# Patient Record
Sex: FEMALE | Race: WHITE
[De-identification: names, ages, dates, MRNs, and addresses within clinical notes are randomized per-mention and may not be internally consistent; named-entity substitution may affect disease eponyms.]

---

## 2019-07-24 ENCOUNTER — HOSPITAL ENCOUNTER (OUTPATIENT)
Dept: HOSPITAL 74 - JER | Age: 84
Setting detail: OBSERVATION
LOS: 1 days | Discharge: HOME | End: 2019-07-25
Attending: INTERNAL MEDICINE | Admitting: INTERNAL MEDICINE
Payer: COMMERCIAL

## 2019-07-24 VITALS — BODY MASS INDEX: 25 KG/M2

## 2019-07-24 DIAGNOSIS — H40.9: ICD-10-CM

## 2019-07-24 DIAGNOSIS — I10: ICD-10-CM

## 2019-07-24 DIAGNOSIS — R55: Primary | ICD-10-CM

## 2019-07-24 DIAGNOSIS — F43.9: ICD-10-CM

## 2019-07-24 DIAGNOSIS — E03.9: ICD-10-CM

## 2019-07-24 DIAGNOSIS — R73.9: ICD-10-CM

## 2019-07-24 LAB
ALBUMIN SERPL-MCNC: 3.5 G/DL (ref 3.4–5)
ALP SERPL-CCNC: 96 U/L (ref 45–117)
ALT SERPL-CCNC: 25 U/L (ref 13–61)
ANION GAP SERPL CALC-SCNC: 6 MMOL/L (ref 8–16)
APPEARANCE UR: CLEAR
AST SERPL-CCNC: 18 U/L (ref 15–37)
BASOPHILS # BLD: 0.5 % (ref 0–2)
BILIRUB SERPL-MCNC: 0.3 MG/DL (ref 0.2–1)
BILIRUB UR STRIP.AUTO-MCNC: NEGATIVE MG/DL
BUN SERPL-MCNC: 18.6 MG/DL (ref 7–18)
CALCIUM SERPL-MCNC: 8.6 MG/DL (ref 8.5–10.1)
CHLORIDE SERPL-SCNC: 106 MMOL/L (ref 98–107)
CO2 SERPL-SCNC: 26 MMOL/L (ref 21–32)
COLOR UR: YELLOW
CREAT SERPL-MCNC: 1 MG/DL (ref 0.55–1.3)
DEPRECATED RDW RBC AUTO: 13.9 % (ref 11.6–15.6)
EOSINOPHIL # BLD: 0.9 % (ref 0–4.5)
GLUCOSE SERPL-MCNC: 162 MG/DL (ref 74–106)
HCT VFR BLD CALC: 35.1 % (ref 32.4–45.2)
HGB BLD-MCNC: 12.3 GM/DL (ref 10.7–15.3)
INR BLD: 1.05 (ref 0.83–1.09)
KETONES UR QL STRIP: NEGATIVE
LEUKOCYTE ESTERASE UR QL STRIP.AUTO: NEGATIVE
LYMPHOCYTES # BLD: 14.7 % (ref 8–40)
MCH RBC QN AUTO: 32 PG (ref 25.7–33.7)
MCHC RBC AUTO-ENTMCNC: 34.9 G/DL (ref 32–36)
MCV RBC: 91.6 FL (ref 80–96)
MONOCYTES # BLD AUTO: 7.8 % (ref 3.8–10.2)
NEUTROPHILS # BLD: 76.1 % (ref 42.8–82.8)
NITRITE UR QL STRIP: NEGATIVE
PH UR: 6.5 [PH] (ref 5–8)
PLATELET # BLD AUTO: 213 K/MM3 (ref 134–434)
PMV BLD: 8 FL (ref 7.5–11.1)
POTASSIUM SERPLBLD-SCNC: 4 MMOL/L (ref 3.5–5.1)
PROT SERPL-MCNC: 6.6 G/DL (ref 6.4–8.2)
PROT UR QL STRIP: (no result)
PROT UR QL STRIP: NEGATIVE
PT PNL PPP: 12.4 SEC (ref 9.7–13)
RBC # BLD AUTO: 3.83 M/MM3 (ref 3.6–5.2)
SODIUM SERPL-SCNC: 138 MMOL/L (ref 136–145)
SP GR UR: 1.02 (ref 1.01–1.03)
UROBILINOGEN UR STRIP-MCNC: 1 MG/DL (ref 0.2–1)
WBC # BLD AUTO: 7.8 K/MM3 (ref 4–10)

## 2019-07-24 PROCEDURE — G0378 HOSPITAL OBSERVATION PER HR: HCPCS

## 2019-07-24 NOTE — PDOC
Rapid Medical Evaluation


Medical Evaluation: 





07/24/19 21:14


This patient had a brief in-person evaluation in triage





CC:fever, headache and sorethroat since Monday


     [place]





PE: NAD


     unlabored breathing


     heart s1s2





orders: antipyretic, rapid strep





This patient will proceed to the ED for further evaluation


07/24/19 21:15








**Discharge Disposition





- Diagnosis


 Fever








- Referrals





- Patient Instructions





- Post Discharge Activity

## 2019-07-24 NOTE — PDOC
Documentation entered by Karolyn Marquez SCRIBE, acting as scribe for Annabelle Bangura DO.








Annabelle Bangura DO:  This documentation has been prepared by the Alma morocho Adrianna, SCRIBE, under my direction and personally reviewed by me in its 

entirety.  I confirm that the documentation accurately reflects all work, 

treatment, procedures, and medical decision making performed by me.  





History of Present Illness





- General


Chief Complaint: Syncope/Near Syncope


Stated Complaint: SYNCOPE


Time Seen by Provider: 07/24/19 22:22





- History of Present Illness


Initial Comments: 


The patient is an 84 year old female, with a significant PMH of HTN, hypothyroid

, and glaucoma, who presents to the ED [facility] for evaluation of syncope. Patient 

notes she was at the diner, when she suddenly felt warm, tired, and strange. 

Patients son notes she became lethargic, began to slump over, and became 

unresponsive. He notes this episode lasted for 3-4 minutes, followed by patient 

returning to baseline. He denies any head contusion, shaking, or incontinence 

during the episode. She denies any lightheadedness, dizziness, chest pain, SOB, 

palpitations, headache, blurry vision, changes in vision, dysuria, nausea, vomit

, diarrhea prior to the episode, or while in the ED. 





Allergies: Brimonidine, hydrochlorothiazide, epinephrine, lisinopril 


Surgical History: Right breast mass removal, appendectomy 


Social History: Denies EtOH, tobacco, or illicit drug use


PCP: NOS








Past History





- Past Medical History


Allergies/Adverse Reactions: 


 Allergies











Allergy/AdvReac Type Severity Reaction Status Date / Time


 


brimonidine [From Alphagan P] Allergy   Verified 07/24/19 23:12


 


hydrochlorothiazide Allergy   Verified 07/24/19 23:12


 


epinephrine AdvReac   Verified 07/24/19 23:12


 


lisinopril AdvReac  Cough Verified 07/24/19 23:12











Home Medications: 


Ambulatory Orders





Latanoprost 0.005% Eye Drops [Xalatan 0.005% Eye Drops -] 1 drop HS 07/24/19 


Levothyroxine [Synthroid -] 50 mcg PO AM 07/24/19 


Olmesartan Medoxomil [Benicar (Nf)] 20 mg PO AM 07/24/19 


Timolol 0.5% [Timoptic 0.5%] 1 drop OD AM 07/24/19 











- Suicide/Smoking/Psychosocial Hx


Smoking History: Never smoked


Have you smoked in the past 12 months: No


Information on smoking cessation initiated: No


Hx Alcohol Use: No


Drug/Substance Use Hx: No





**Review of Systems





- Review of Systems


Comments:: 


GENERAL/CONSTITUTIONAL: +S/p syncope. +Warm prior to episode. +Lethargic prior 

to episode. No fever or chills. No weakness.


HEAD, EYES, EARS, NOSE AND THROAT: No change in vision. No ear pain or 

discharge. No sore throat.


GASTROINTESTINAL: No nausea, vomiting, diarrhea or constipation.


GENITOURINARY: No dysuria, frequency, or change in urination.


CARDIOVASCULAR: No chest pain or shortness of breath.


RESPIRATORY: No cough, wheezing, or hemoptysis.


MUSCULOSKELETAL: No joint or muscle swelling or pain. No neck or back pain.


SKIN: No rash


NEUROLOGIC: No headache, vertigo, loss of consciousness, or change in strength/

sensation.


ENDOCRINE: No increased thirst. No abnormal weight change.


HEMATOLOGIC/LYMPHATIC: No anemia, easy bleeding, or history of blood clots.


ALLERGIC/IMMUNOLOGIC: No hives or skin allergy.








*Physical Exam





- Vital Signs


 Last Vital Signs











Temp Pulse Resp BP Pulse Ox


 


 98.1 F   78   20   170/81   98 


 


 07/24/19 21:25  07/24/19 22:35  07/24/19 22:35  07/24/19 22:35  07/24/19 22:35














- Physical Exam


Comments: 


Constitutional: Awake, alert, oriented.  No acute distress.


Head:  Normocephalic.  Atraumatic


Eyes:  PERRL. EOMI.  Conjunctivae are not pale.


ENT:  Mucous membranes are moist and intact. Posterior pharynx without exudates 

or erythema. Uvula midline.


Neck:  Supple.  Full ROM. No lymphadenopathy.


Cardiovascular:  Regular rate.  Regular rhythm. S1, S2 regular.  Distal pulses 

are 2+ and symmetric.  


Pulmonary/Chest:  No evidence of respiratory distress.  Clear to auscultation 

bilaterally  No wheezing, rales or rhonchi.


Abdominal:  Soft and nondistended.  There is no tenderness.  No rebound, 

guarding or rigidity.  No organomegaly. No palpable masses. Good bowel sounds.


Back:  No CVA tenderness.


Musculoskeletal:  No edema.  No cyanosis.  No clubbing.  Full range of motion 

in all extremities.  No calf tenderness. Radial/pedal pulses are intact and 2+ 

bilaterally


Skin:  Skin is warm and dry.  No petechiae.  No purpura.  


Neurological:  Alert and oriented to person, place, and time.  Cranial nerves II

-XII are grossly intact. Normal speech. Strength is grossly symmetric. No 

sensory deficits.


Psychiatric:  Good eye contact.  Normal interaction, affect and behavior.





**Heart Score/ECG Review





- ECG Intrepretation


Comment:: 





07/24/19 22:52


sinus at 69 with 1st degree av block, nl axis, nl interval, no acute st/t wave 

findings





ED Treatment Course





- LABORATORY


CBC & Chemistry Diagram: 


 07/24/19 22:50





 07/24/19 22:50





- ADDITIONAL ORDERS


Additional order review: 


 Laboratory  Results











  07/24/19 07/24/19 07/24/19





  23:35 22:50 22:50


 


PT with INR   12.40 


 


INR   1.05 


 


PTT (Actin FS)   


 


Sodium   


 


Potassium   


 


Chloride   


 


Carbon Dioxide   


 


Anion Gap   


 


BUN   


 


Creatinine   


 


Est GFR (CKD-EPI)AfAm   


 


Est GFR (CKD-EPI)NonAf   


 


Random Glucose   


 


Calcium   


 


Magnesium    1.9


 


Total Bilirubin   


 


AST   


 


ALT   


 


Alkaline Phosphatase   


 


Creatine Kinase   


 


Troponin I   


 


Total Protein   


 


Albumin   


 


Urine Color  Yellow  


 


Urine Appearance  Clear  


 


Urine pH  6.5  


 


Ur Specific Gravity  1.018  


 


Urine Protein  Trace  


 


Urine Glucose (UA)  Negative  


 


Urine Ketones  Negative  


 


Urine Blood  Negative  


 


Urine Nitrite  Negative  


 


Urine Bilirubin  Negative  


 


Urine Urobilinogen  1.0  


 


Ur Leukocyte Esterase  Negative  














  07/24/19 07/24/19 07/24/19





  22:50 22:50 22:50


 


PT with INR   


 


INR   


 


PTT (Actin FS)    25.4


 


Sodium  138  


 


Potassium  4.0  


 


Chloride  106  


 


Carbon Dioxide  26  


 


Anion Gap  6 L  


 


BUN  18.6 H  


 


Creatinine  1.0  


 


Est GFR (CKD-EPI)AfAm  59.91  


 


Est GFR (CKD-EPI)NonAf  51.69  


 


Random Glucose  162 H  


 


Calcium  8.6  


 


Magnesium   


 


Total Bilirubin  0.3  


 


AST  18  


 


ALT  25  


 


Alkaline Phosphatase  96  


 


Creatine Kinase   82 


 


Troponin I   < 0.02 


 


Total Protein  6.6  


 


Albumin  3.5  


 


Urine Color   


 


Urine Appearance   


 


Urine pH   


 


Ur Specific Gravity   


 


Urine Protein   


 


Urine Glucose (UA)   


 


Urine Ketones   


 


Urine Blood   


 


Urine Nitrite   


 


Urine Bilirubin   


 


Urine Urobilinogen   


 


Ur Leukocyte Esterase   








 











  07/24/19





  22:50


 


RBC  3.83


 


MCV  91.6


 


MCHC  34.9


 


RDW  13.9


 


MPV  8.0


 


Neutrophils %  76.1


 


Lymphocytes %  14.7


 


Monocytes %  7.8


 


Eosinophils %  0.9


 


Basophils %  0.5














- RADIOLOGY


Radiology Studies Ordered: 














 Category Date Time Status


 


 HEAD CT WITHOUT CONTRAST [CT] Stat CT Scan  07/24/19 22:42 Taken


 


 CHEST X-RAY PORTABLE* [RAD] Stat Radiology  07/24/19 22:42 Taken











Radiograph Interpretation: 


EXAM: CT HEAD WITHOUT CONTRAST


No acute brain parenchymal abnormality. No hemorrhage, mass or acute 

territorial infarct. Age-related involutional changes and chronic small vessel 

ischemic changes. No skull fracture. Clear visualized paranasal sinuses. 

Visualized mastoid air cells clear.


Reported by: Alexandra De La Garza M.D. 07/25/2019 00:43 EST





Medical Decision Making





- Medical Decision Making





07/24/19 22:52


a/p: 83yo female with a witness syncopal episode while at dinner with her son


-pt denies cp/sob/palpitations prior to syncope


-had finished eating dinner


-son witnessed episode


-states she was unarousable but had just slumped over in the [place]


-no leg cramping, no pleuritic cp


-no ha, no paresthesias


-no weakness


-no vision changes


-will send labs, ekg, cxr, head ct


-will monitor on tele





07/24/19 23:41


labs reviewed


neg trop





07/24/19 23:42


cxr clear


07/24/19 23:57


ua neg


07/25/19 01:10


ct head neg


discussed labs and imaigng


pt willing to stay for syncope workup


microblog sent to [facility] for obs placement


son at the bedside updated, patient updated


07/25/19 01:24


case discussed with [facility] who accepts pt to tele obs





*DC/Admit/Observation/Transfer


Diagnosis at time of Disposition: 


 Syncope








- Discharge Dispostion


Condition at time of disposition: Fair


Decision to Admit order: Yes


Decision to Admit order Date/Time: 


Decision to Admit Order











 Category Date Time Status


 


 Decision to Admit to Hospital Routine Admission  07/25/19 01:12 Active














- Referrals





- Patient Instructions





- Post Discharge Activity





- Attestations


Physician Attestion: 





07/25/19 01:11








I, Dr. Annabelle Bangura, DO, attest that this document has been prepared under 

my direction and personally reviewed by me in its entirety.   I further attest, 

that it accurately reflects all work, treatment, procedures and medical decision

-making performed by me.

## 2019-07-25 VITALS — HEART RATE: 69 BPM | TEMPERATURE: 98.6 F | SYSTOLIC BLOOD PRESSURE: 177 MMHG | DIASTOLIC BLOOD PRESSURE: 83 MMHG

## 2019-07-25 LAB
ALBUMIN SERPL-MCNC: 3.2 G/DL (ref 3.4–5)
ALP SERPL-CCNC: 80 U/L (ref 45–117)
ALT SERPL-CCNC: 23 U/L (ref 13–61)
ANION GAP SERPL CALC-SCNC: 6 MMOL/L (ref 8–16)
AST SERPL-CCNC: 15 U/L (ref 15–37)
BASOPHILS # BLD: 0.6 % (ref 0–2)
BILIRUB SERPL-MCNC: 0.3 MG/DL (ref 0.2–1)
BUN SERPL-MCNC: 19.6 MG/DL (ref 7–18)
CALCIUM SERPL-MCNC: 8.5 MG/DL (ref 8.5–10.1)
CHLORIDE SERPL-SCNC: 106 MMOL/L (ref 98–107)
CHOLEST SERPL-MCNC: 188 MG/DL (ref 50–200)
CO2 SERPL-SCNC: 26 MMOL/L (ref 21–32)
CREAT SERPL-MCNC: 0.7 MG/DL (ref 0.55–1.3)
DEPRECATED RDW RBC AUTO: 14 % (ref 11.6–15.6)
EOSINOPHIL # BLD: 0.8 % (ref 0–4.5)
GLUCOSE SERPL-MCNC: 102 MG/DL (ref 74–106)
HCT VFR BLD CALC: 34.1 % (ref 32.4–45.2)
HDLC SERPL-MCNC: 88 MG/DL (ref 40–60)
HGB BLD-MCNC: 11.7 GM/DL (ref 10.7–15.3)
LYMPHOCYTES # BLD: 22.4 % (ref 8–40)
MAGNESIUM SERPL-MCNC: 1.9 MG/DL (ref 1.8–2.4)
MCH RBC QN AUTO: 31.7 PG (ref 25.7–33.7)
MCHC RBC AUTO-ENTMCNC: 34.4 G/DL (ref 32–36)
MCV RBC: 92.2 FL (ref 80–96)
MONOCYTES # BLD AUTO: 7.5 % (ref 3.8–10.2)
NEUTROPHILS # BLD: 68.7 % (ref 42.8–82.8)
PLATELET # BLD AUTO: 204 K/MM3 (ref 134–434)
PMV BLD: 8.2 FL (ref 7.5–11.1)
POTASSIUM SERPLBLD-SCNC: 4.2 MMOL/L (ref 3.5–5.1)
PROT SERPL-MCNC: 5.8 G/DL (ref 6.4–8.2)
RBC # BLD AUTO: 3.69 M/MM3 (ref 3.6–5.2)
SODIUM SERPL-SCNC: 139 MMOL/L (ref 136–145)
TRIGL SERPL-MCNC: 42 MG/DL (ref 0–150)
WBC # BLD AUTO: 5.4 K/MM3 (ref 4–10)

## 2019-07-25 NOTE — PN
Teaching Attending Note


Name of Resident: Rosalba Dodson





ATTENDING PHYSICIAN STATEMENT





I saw and evaluated the patient.


I reviewed the resident's note and discussed the case with the resident.


I agree with the resident's findings and plan as documented.








SUBJECTIVE:asymptomatic. states she has not had these symptoms in the past. 

admits to not eating all day prior to the event. is closely monitored under her 

cardiologist and goes for routine testing. son reports EMS checked her sugar 

and was low but does not recall level. denies Cp, SOB, fever, chills, N/V/C/D


no medication changes and claims compliance








OBJECTIVE:


 Last Vital Signs











Temp Pulse Resp BP Pulse Ox


 


 98.1 F   70   16   159/81   100 


 


 07/25/19 07:00  07/25/19 07:00  07/25/19 07:00  07/25/19 07:00  07/25/19 07:00








General NAD


HEENT no nystagmus, no carotid bruti


CV S1 S2 RRR no murmur/rub/gallop


lungs CTA B/l no wheezing/rales/rhonchi





ASSESSMENT AND PLAN:


85yo F with PMH HTN and hypothyroid presented after syncopal episode at home


1. Syncope- likely due to HTN vs hypoglycemia. currently asymptoamtic. echo 

reviewed. awaiting carotid ultrasound. trop negative. orthostatics negative


2. HTN- uncontrolled and above goal. discussed with patient risks of 

uncontrolled HTN and need to lower it to goal. states SBP always in 150's and 

her cardiologist aware. refuses to try a secondary agent to lower BP. says she 

will d/w her cardiologist next week


3. Hypothyroid- TSH WNL. cont LT4


4. DVT ppx- EAM


5. spoke with son present at bedside. can d/c home pending results of carotid 

doppler

## 2019-07-25 NOTE — EKG
Test Reason : 

Blood Pressure : ***/*** mmHG

Vent. Rate : 069 BPM     Atrial Rate : 069 BPM

   P-R Int : 236 ms          QRS Dur : 100 ms

    QT Int : 404 ms       P-R-T Axes : 083 000 041 degrees

   QTc Int : 432 ms

 

SINUS RHYTHM WITH 1ST DEGREE A-V BLOCK

OTHERWISE NORMAL ECG

NO PREVIOUS ECGS AVAILABLE

Confirmed by JEFFREY MEYERS, ELENA ([id number]) on 7/25/2019 2:37:49 PM

 

Referred By:             Confirmed By:ELENA MURPHY MD

## 2019-07-25 NOTE — ECHO
______________________________________________________________________________



Name: MILLA NICK                                 Exam:Adult Echocardiogram

MRN: [id number]         Study Date: 2019 08:52 AM

Age: 84 yrs

______________________________________________________________________________



Reason For Study: SYNCOPE

Height: 63 in        Weight: 141 lb        BSA: 1.7 m2



______________________________________________________________________________



MMode/2D Measurements & Calculations

IVSd: 0.77 cm                                         Ao root diam: 2.6 cm

LVIDd: 4.5 cm                                         LA dimension: 3.4 cm

LVIDs: 3.0 cm

LVPWd: 0.68 cm



_______________________________________________________

EDV(Teich): 92.2 ml                                   LVOT diam: 2.0 cm

ESV(Teich): 33.9 ml



Doppler Measurements & Calculations

MV E max troy: 65.6 cm/sec                              Ao V2 max: 152.0 cm/sec

MV A max troy: 94.8 cm/sec                              Ao max P.2 mmHg

MV E/A: 0.69                                           Ao V2 mean: 104.3 cm/sec

MV dec time: 0.33 sec                                  Ao mean P.9 mmHg

                                                       Ao V2 VTI: 35.6 cm



                                                       JULIO CESAR(I,D): 1.8 cm2

                                                       AI P1/2t: 526.0 msec

                                                       JULIO CESAR(V,D): 1.8 cm2



________________________________________________________

AI max troy: 510.4 cm/sec                               LV V1 max PG: 3.2 mmHg

AI max P.2 mmHg                                  LV V1 mean P.8 mmHg

AI dec slope: 284.2 cm/sec2                            LV V1 max: 89.8 cm/sec

                                                       LV V1 mean: 63.0 cm/sec

                                                       LV V1 VTI: 21.7 cm

________________________________________________________



MR max troy: 270.1 cm/sec                               SV(LVOT): 65.6 ml

MR max P.2 mmHg

________________________________________________________



TR max troy: 193.3 cm/sec                               Med Peak E' Troy: 5.0 cm/sec

TR max PG: 15.6 mmHg                                   Med E/e': 13.2

                                                       Lat Peak E' Troy: 6.4 cm/sec

                                                       Lat E/e': 10.3



______________________________________________________________________________



Procedure

A complete two-dimensional transthoracic echocardiogram was performed (2D, M-mode, Doppler and color 
flow

Doppler).

Left Ventricle

The left ventricular size, thickness and function are normal. The left ventricular ejection fraction 
is

normal. Ejection Fraction = 60-65%. The left ventricular wall motion is normal.

Right Ventricle

The right ventricle is normal in size and function.

Atria

Normal left and right atrial size and function.

Mitral Valve

There is trace mitral regurgitation.

Tricuspid Valve

There is trace tricuspid regurgitation. Right ventricular systolic pressure is normal.

Aortic Valve

The aortic valve is trileaflet. No hemodynamically significant valvular aortic stenosis. Mild aortic

regurgitation.

Pulmonic Valve

There is no pulmonic valvular regurgitation.

Great Vessels

The aortic root is normal size.

Pericardium/Pleura

There is no pericardial effusion.

______________________________________________________________________________





Interpretation Summary

The left ventricular size, thickness and function are normal

The right ventricle is normal in size and function.

There is trace mitral regurgitation.

There is trace tricuspid regurgitation.

Mild aortic regurgitation.





MD Rivas Cespedes 2019 02:07 PM

## 2019-07-25 NOTE — HP
CHIEF COMPLAINT: syncope





PCP: none





HISTORY OF PRESENT ILLNESS:


Ms. Fairbanks is an 85yo female with HTN, hypothyroidism, and glaucoma who presents 

following a syncope episode. She reports being at dinner with her son and felt 

warmth and weakness and then lost consciousness. She did not have dizziness or 

tunnel vision prior to syncope. She remembers waking up on the floor of the 

restaurant. She reports that her son told her she was slumped over and was not 

responsive to physical or verbal stimuli for about 3-4 minutes. She felt normal 

following the episode. She reports having had a viral illness a couple months 

ago and fully recovered. She denies HA, SOB, chest pain, nausea, or vomiting. 

She and her son were cleaning out her ex-'s apartment today prior to 

dinner. She reports having had a long day because she also drove from [place] prior 

to cleaning. She was seen by her cardiologist in May after a hypertensive 

episode and was told her checkup went well.





ER course was notable for:


CT head negative for acute processes





Recent Travel:


no





PAST MEDICAL HISTORY:


HTN


hypothyroidism


glaucoma





PAST SURGICAL HISTORY:


R breast mass removal, unsure of diagnosis


appendectomy


macular repair





Social History:


Smoking: no


Alcohol: no


Drugs: no


Lives in [place]. She is a . Her ex- passed away in March, and she was 

his health proxy.





Family History:


father passed from [place], had atherosclerosis





Allergies





brimonidine [From Alphagan P] Allergy (Verified 07/24/19 23:12)


 


hydrochlorothiazide Allergy (Verified 07/24/19 23:12)


 


epinephrine Adverse Reaction (Verified 07/24/19 23:12)


 


lisinopril Adverse Reaction (Verified 07/24/19 23:12)


 Cough





HOME MEDICATIONS:


 Home Medications











 Medication  Instructions  Recorded


 


Latanoprost 0.005% Eye Drops 1 drop HS 07/24/19





[Xalatan 0.005% Eye Drops -]  


 


Levothyroxine [Synthroid -] 50 mcg PO AM 07/24/19


 


Olmesartan Medoxomil [Benicar (Nf)] 20 mg PO AM 07/24/19


 


Timolol 0.5% [Timoptic 0.5%] 1 drop OD AM 07/24/19








REVIEW OF SYSTEMS


CONSTITUTIONAL: 


Absent:  fever, chills, diaphoresis, generalized weakness


HEENT: 


Absent:  rhinorrhea, nasal congestion, throat pain, ear pain, visual changes


CARDIOVASCULAR: 


Present: syncope


Absent: chest pain, palpitations


RESPIRATORY: 


Absent: cough, shortness of breath, dyspnea with exertion


GASTROINTESTINAL:


Absent: abdominal pain, nausea, vomiting


GENITOURINARY: 


Absent: dysuria


MUSCULOSKELETAL: 


Absent: myalgias


SKIN: 


Absent: rash, itching, pallor


HEMATOLOGIC/IMMUNOLOGIC: 


Absent: easy bleeding, easy bruising, lymphadenopathy, frequent infections


ENDOCRINE:


Absent: unexplained weight gain, unexplained weight loss, heat intolerance, 

cold intolerance


NEUROLOGIC: 


Absent: headache, focal weakness or paresthesias, dizziness, unsteady gait, 

seizure, mental status changes, bladder or bowel incontinence


PSYCHIATRIC: 


Absent: anxiety, depression, suicidal or homicidal ideation, hallucinations.








PHYSICAL EXAMINATION


 Vital Signs - 24 hr











  07/24/19 07/24/19





  21:25 22:35


 


Temperature 98.1 F 


 


Pulse Rate 70 


 


Pulse Rate [  78





Apical]  


 


Respiratory 18 20





Rate  


 


Blood Pressure 157/83 


 


Blood Pressure  170/81





[Left Arm]  


 


O2 Sat by Pulse 99 98





Oximetry (%)  











GENERAL: Awake, alert, and fully oriented, in no acute distress.


HEAD: Normal with no signs of trauma.


EYES: Pupils equal, round and reactive to light, extraocular movements intact, 

sclera anicteric, conjunctiva clear. No lid lag.


EARS, NOSE, THROAT: Ears normal, nares patent. Moist mucous membranes.


NECK: Normal range of motion, supple without lymphadenopathy, JVD, or masses.


LUNGS: Breath sounds equal, clear to auscultation bilaterally. No wheezes, and 

no crackles. No accessory muscle use.


HEART: Regular rate and rhythm, normal S1 and S2 without murmur, rub or gallop.


ABDOMEN: Soft, nontender, not distended, normoactive bowel sounds, no guarding, 

no rebound, no masses.  No hepatomegaly or  splenomegaly. 


MUSCULOSKELETAL: Normal range of motion at all joints. No bony deformities or 

tenderness.


UPPER EXTREMITIES: 2+ pulses, warm, well-perfused. No cyanosis. No clubbing. No 

peripheral edema.


LOWER EXTREMITIES: 2+ pulses, warm, well-perfused. No calf tenderness. No 

peripheral edema. 


NEUROLOGICAL:  Cranial nerves II-XII intact. Normal speech. +5 strength in all 

extremities


PSYCHIATRIC: Cooperative. Good eye contact. Appropriate mood and affect.


SKIN: Warm, dry, normal turgor, no rashes or lesions noted, normal capillary 

refill. 





 Laboratory Results - last 24 hr











  07/24/19 07/24/19 07/24/19





  22:50 22:50 22:50


 


WBC    7.8


 


RBC    3.83


 


Hgb    12.3


 


Hct    35.1


 


MCV    91.6


 


MCH    32.0


 


MCHC    34.9


 


RDW    13.9


 


Plt Count    213


 


MPV    8.0


 


Absolute Neuts (auto)    6.0


 


Neutrophils %    76.1


 


Lymphocytes %    14.7


 


Monocytes %    7.8


 


Eosinophils %    0.9


 


Basophils %    0.5


 


Nucleated RBC %    0


 


PT with INR   


 


INR   


 


PTT (Actin FS)  25.4  


 


Sodium   


 


Potassium   


 


Chloride   


 


Carbon Dioxide   


 


Anion Gap   


 


BUN   


 


Creatinine   


 


Est GFR (CKD-EPI)AfAm   


 


Est GFR (CKD-EPI)NonAf   


 


Random Glucose   


 


Calcium   


 


Magnesium   


 


Total Bilirubin   


 


AST   


 


ALT   


 


Alkaline Phosphatase   


 


Creatine Kinase   82 


 


Troponin I   < 0.02 


 


Total Protein   


 


Albumin   


 


Urine Color   


 


Urine Appearance   


 


Urine pH   


 


Ur Specific Gravity   


 


Urine Protein   


 


Urine Glucose (UA)   


 


Urine Ketones   


 


Urine Blood   


 


Urine Nitrite   


 


Urine Bilirubin   


 


Urine Urobilinogen   


 


Ur Leukocyte Esterase   














  07/24/19 07/24/19 07/24/19





  22:50 22:50 22:50


 


WBC   


 


RBC   


 


Hgb   


 


Hct   


 


MCV   


 


MCH   


 


MCHC   


 


RDW   


 


Plt Count   


 


MPV   


 


Absolute Neuts (auto)   


 


Neutrophils %   


 


Lymphocytes %   


 


Monocytes %   


 


Eosinophils %   


 


Basophils %   


 


Nucleated RBC %   


 


PT with INR    12.40


 


INR    1.05


 


PTT (Actin FS)   


 


Sodium  138  


 


Potassium  4.0  


 


Chloride  106  


 


Carbon Dioxide  26  


 


Anion Gap  6 L  


 


BUN  18.6 H  


 


Creatinine  1.0  


 


Est GFR (CKD-EPI)AfAm  59.91  


 


Est GFR (CKD-EPI)NonAf  51.69  


 


Random Glucose  162 H  


 


Calcium  8.6  


 


Magnesium   1.9 


 


Total Bilirubin  0.3  


 


AST  18  


 


ALT  25  


 


Alkaline Phosphatase  96  


 


Creatine Kinase   


 


Troponin I   


 


Total Protein  6.6  


 


Albumin  3.5  


 


Urine Color   


 


Urine Appearance   


 


Urine pH   


 


Ur Specific Gravity   


 


Urine Protein   


 


Urine Glucose (UA)   


 


Urine Ketones   


 


Urine Blood   


 


Urine Nitrite   


 


Urine Bilirubin   


 


Urine Urobilinogen   


 


Ur Leukocyte Esterase   














  07/24/19





  23:35


 


WBC 


 


RBC 


 


Hgb 


 


Hct 


 


MCV 


 


MCH 


 


MCHC 


 


RDW 


 


Plt Count 


 


MPV 


 


Absolute Neuts (auto) 


 


Neutrophils % 


 


Lymphocytes % 


 


Monocytes % 


 


Eosinophils % 


 


Basophils % 


 


Nucleated RBC % 


 


PT with INR 


 


INR 


 


PTT (Actin FS) 


 


Sodium 


 


Potassium 


 


Chloride 


 


Carbon Dioxide 


 


Anion Gap 


 


BUN 


 


Creatinine 


 


Est GFR (CKD-EPI)AfAm 


 


Est GFR (CKD-EPI)NonAf 


 


Random Glucose 


 


Calcium 


 


Magnesium 


 


Total Bilirubin 


 


AST 


 


ALT 


 


Alkaline Phosphatase 


 


Creatine Kinase 


 


Troponin I 


 


Total Protein 


 


Albumin 


 


Urine Color  Yellow


 


Urine Appearance  Clear


 


Urine pH  6.5


 


Ur Specific Gravity  1.018


 


Urine Protein  Trace


 


Urine Glucose (UA)  Negative


 


Urine Ketones  Negative


 


Urine Blood  Negative


 


Urine Nitrite  Negative


 


Urine Bilirubin  Negative


 


Urine Urobilinogen  1.0


 


Ur Leukocyte Esterase  Negative











ASSESSMENT/PLAN:


The pt is an 85yo female with history of HTN, hypothyroidism, and glaucoma who 

presents after syncope. Her symptoms resolved quickly after she regained 

consciousness.





1. syncope- She has no prior hx of neuro conditions. Pt had CN2-12 intact, 

sensory and motor functions intact, and is A/O x 3. CT head was negative for 

acute hemorrhage or ischemia. Pt was not hypotensive (170/81), orthostatics 

were negative per ED, and is afebrile. UA is negative. She reports having to 

pack her ex-'s place who recently passed away. She has a family hx of 

atherosclerosis, and she has not had a carotid doppler. She was eating prior to 

syncope. This could possibly be post-prandial syncope vs ischemic changes given 

family hx. Less likely is infection.


-monitor BP


-carotid doppler


-echo


-tele


-CBC


-CMP


-TSH


-lipid panel


-PT consult





2. HTN- She said she had an episode in May where her BP was extremely high in 

an ED and was given IV meds to control. She saw her cardiologist afterward and 

was deemed ok. At admission, systolic was elevated (170/81). 


-start losartan


-monitor BP





3. hypothyroidism- Thyroid disorders could cause syncope.


-check TSH


-continue home synthroid





4. hyperglycemia- BG at admission 162. No history or family hx of DM. She 

denies urinary frequency. It is possible she could have DM given her age and co-

morbid conditions. 


-check HbA1C


-CMP to monitor BG





DVT Ppe


Lovenox 40mg Q daily





FEN


PO fluids


monitor electrolytes


sodium restriction diet





Visit type





- Emergency Visit


Emergency Visit: Yes


ED Registration Date: 07/25/19


Care time: The patient presented to the Emergency Department on the above date 

and was hospitalized for further evaluation of their emergent condition.





- New Patient


This patient is new to me today: Yes


Date on this admission: 07/25/19





- Critical Care


Critical Care patient: No





ATTENDING PHYSICIAN STATEMENT





I saw and evaluated the patient.


I reviewed the resident's note and discussed the case with the resident.


I agree with the resident's findings and plan as documented.








SUBJECTIVE:








OBJECTIVE:








ASSESSMENT AND PLAN:

## 2019-07-25 NOTE — PN
Teaching Attending Note


Name of Resident: Slime Sarkar





ATTENDING PHYSICIAN STATEMENT





I saw and evaluated the patient.


I reviewed the resident's note and discussed the case with the resident.


I agree with the resident's findings and plan as documented.





Patient presents to the ER with a CC of Syncope during a meal; did have 

witnessed LOC without seizure activity or fall; did not hit head.  Never 

happened before.  She had negative orthostatics in the ER.  Does have some 

recent emotional stressors and was working hard moving boxes all day.  [place] 

[place]





Negative orthostatics, VS, labs, imaging reviewed


NAD, AAOx3, resting in bed


NC AT EOMI PERRLA


RRR s1/2 no mgr


Lungs CTAB, w/ sym exp 





EKG reviewed


Telemetry ordered


Echo pending





ASSESSMENT AND PLAN:


Patient presents with syncope; she is noted to have negative orthostatic VS.  

DDx includes post prandial vs. hypotension; no neuro sx.  No apparent 

arrhythmias





# Syncope


# HTN


# Acute Stress





Will check echo, recheck orthostatics.  given history of HTN and unknown lipid 

profile can check carotid dopplers as not unreasonable to assume could have 

some vascular disease.  Monitor for recurrance. Fall precautions.  





Full Code

## 2019-07-26 NOTE — DS
Physical Exam: 


SUBJECTIVE: Patient seen and examined. In no acute distress. Denies dizziness, 

CP, SOB, N/V/D, chills, fevers. 








OBJECTIVE:





 Vital Signs











 Period  Temp  Pulse  Resp  BP Sys/Kimball  Pulse Ox


 


 Last 24 Hr  98.6 F  69  16  177/83  98








PHYSICAL EXAM





GENERAL: The patient is awake, alert, and fully oriented, in no acute distress.


LUNGS: Breath sounds equal, clear to auscultation bilaterally, no wheezes, no 

crackles, no accessory muscle use. 


HEART: Regular rate and rhythm, S1, S2 without murmur, rub or gallop. No JVD, 

no bruits.


ABDOMEN: Soft, nontender, nondistended, normoactive bowel sounds, no guarding, 

no rebound, no hepatosplenomegaly, no masses


SKIN: Warm, dry, normal turgor, no rashes or lesions noted.





LABS


 Laboratory Last Values











WBC  5.4 K/mm3 (4.0-10.0)   19  05:20    


 


RBC  3.69 M/mm3 (3.60-5.2)   19  05:20    


 


Hgb  11.7 GM/dL (10.7-15.3)   19  05:20    


 


Hct  34.1 % (32.4-45.2)   19  05:20    


 


MCV  92.2 fl (80-96)   19  05:20    


 


MCH  31.7 pg (25.7-33.7)   19  05:20    


 


MCHC  34.4 g/dl (32.0-36.0)   19  05:20    


 


RDW  14.0 % (11.6-15.6)   19  05:20    


 


Plt Count  204 K/MM3 (134-434)   19  05:20    


 


MPV  8.2 fl (7.5-11.1)   19  05:20    


 


Absolute Neuts (auto)  3.7 K/mm3 (1.5-8.0)   19  05:20    


 


Neutrophils %  68.7 % (42.8-82.8)   19  05:20    


 


Lymphocytes %  22.4 % (8-40)  D 19  05:20    


 


Monocytes %  7.5 % (3.8-10.2)   19  05:20    


 


Eosinophils %  0.8 % (0-4.5)   19  05:20    


 


Basophils %  0.6 % (0-2.0)   19  05:20    


 


Nucleated RBC %  0 % (0-0)   19  05:20    


 


PT with INR  12.40 SEC (9.7-13.0)   19  22:50    


 


INR  1.05  (0.83-1.09)   19  22:50    


 


PTT (Actin FS)  25.4 SECONDS (25.2-36.5)   19  22:50    


 


Sodium  139 mmol/L (136-145)   19  05:20    


 


Potassium  4.2 mmol/L (3.5-5.1)   19  05:20    


 


Chloride  106 mmol/L ()   19  05:20    


 


Carbon Dioxide  26 mmol/L (21-32)   19  05:20    


 


Anion Gap  6 MMOL/L (8-16)  L  19  05:20    


 


BUN  19.6 mg/dL (7-18)  H  19  05:20    


 


Creatinine  0.7 mg/dL (0.55-1.3)   19  05:20    


 


Est GFR (CKD-EPI)AfAm  92.21   19  05:20    


 


Est GFR (CKD-EPI)NonAf  79.56   19  05:20    


 


Random Glucose  102 mg/dL ()   19  05:20    


 


Hemoglobin A1c %  5.3 % (4.2-6.3)   19  05:20    


 


Calcium  8.5 mg/dL (8.5-10.1)   19  05:20    


 


Magnesium  1.9 mg/dL (1.8-2.4)   19  05:20    


 


Total Bilirubin  0.3 mg/dL (0.2-1)   19  05:20    


 


AST  15 U/L (15-37)   19  05:20    


 


ALT  23 U/L (13-61)   19  05:20    


 


Alkaline Phosphatase  80 U/L ()   19  05:20    


 


Creatine Kinase  82 U/L ()   19  22:50    


 


Troponin I  < 0.02 ng/ml (0.00-0.05)   19  22:50    


 


Total Protein  5.8 g/dl (6.4-8.2)  L  19  05:20    


 


Albumin  3.2 g/dl (3.4-5.0)  L  19  05:20    


 


Triglycerides  42 mg/dL (0-150)   19  05:20    


 


Cholesterol  188 mg/dL ()   19  05:20    


 


Total LDL Cholesterol  90 mg/dL (5-100)   19  05:20    


 


HDL Cholesterol  88 mg/dL (40-60)  H  19  05:20    


 


TSH  2.24 uIU/ml (0.358-3.74)   19  05:20    


 


Urine Color  Yellow   19  23:35    


 


Urine Appearance  Clear   19  23:35    


 


Urine pH  6.5  (5.0-8.0)   19  23:35    


 


Ur Specific Gravity  1.018  (1.010-1.035)   19  23:35    


 


Urine Protein  Trace  (NEGATIVE)   19  23:35    


 


Urine Glucose (UA)  Negative  (NEGATIVE)   19  23:35    


 


Urine Ketones  Negative  (NEGATIVE)   19  23:35    


 


Urine Blood  Negative  (NEGATIVE)   19  23:35    


 


Urine Nitrite  Negative  (NEGATIVE)   19  23:35    


 


Urine Bilirubin  Negative  (NEGATIVE)   19  23:35    


 


Urine Urobilinogen  1.0 mg/dL (0.2-1.0)   19  23:35    


 


Ur Leukocyte Esterase  Negative  (NEGATIVE)   19  23:35    














HOSPITAL COURSE:


84 y.o. F [facility] HTN, hypothyroidism, glaucoma, Lyme disease who presented after a 

syncopal episode. Neg for orthostatics, head CT, and carotid dopplers. EKG 

showed 1st degree AV block. Vitals significant for hypertensive episode to 193/

94 which has since improved to 170s systolic; pt is on Olmesartan and follows 

up with her cardiologist in [facility] where she lives. Pt given Losartan 

during this visit. It was suggested to add a second hypertensive agent to pt's 

regimen, which she declined. Dannsher home BP is usually in 150s systolic she 

would prefer to speak to her home cardiologist regarding further treatment 

options. Pt is currently clinically stable. 





Date of Admission:19





CXR: Negative


Head CT: Negative


Carotid Doppler: No stenosis


EKst degree AV block


Echo: Trace MR, trace TR, Mild AR





Date of Discharge: 19











Minutes to complete discharge: 36





Discharge Summary


Reason For Visit: SYNCOPE


Condition: Stable





- Instructions


Diet, Activity, Other Instructions: 


Your visit:


You presented to the hospital for a syncopal episode and high blood pressure. 

You were treated with medications.





Follow up with the following physicians:


1. Primary care provider in 1 week.


2. Follow up with your cardiologist in 1 week.





Further intructions:


You are being discharged to your home. Your blood pressure remains above goal. 

We suggested a second agent, however you wanted to discuss this further with 

your cardiologist. Please follow up regarding your blood pressure.





Please continue to eat a low salt diet. 





Please return to the ER if you have any signs or symptoms of syncope, chest pain

, shortness of breath, palpitations, confusion, dizziness, abdominal pain, 

fevers, fatigue, vomiting, diarrhea, muscle pains or weakness.





Please return to the ER if symptoms persist, worsen, or new symptoms arise.


Disposition: HOME





- Home Medications


Comprehensive Discharge Medication List: 


Ambulatory Orders





Latanoprost 0.005% Eye Drops [Xalatan 0.005% Eye Drops -] 1 drop HS 19 


Levothyroxine [Synthroid -] 50 mcg PO AM 19 


Olmesartan Medoxomil [Benicar -] 20 mg PO AM 19 


Timolol 0.5% [Timoptic 0.5%] 1 drop OD AM 19 








This patient is new to me today: No


Emergency Visit: Yes


ED Registration Date: 19


Care time: The patient presented to the Emergency Department on the above date 

and was hospitalized for further evaluation of their emergent condition.


Critical Care patient: No





- Discharge Referral


Referred to [facility] No





ATTENDING PHYSICIAN STATEMENT





I saw and evaluated the patient.


I reviewed the resident's note and discussed the case with the resident.


I agree with the resident's findings and plan as documented.








SUBJECTIVE:








OBJECTIVE:








ASSESSMENT AND PLAN: